# Patient Record
Sex: MALE | Race: WHITE | NOT HISPANIC OR LATINO | Employment: OTHER | URBAN - METROPOLITAN AREA
[De-identification: names, ages, dates, MRNs, and addresses within clinical notes are randomized per-mention and may not be internally consistent; named-entity substitution may affect disease eponyms.]

---

## 2021-02-11 ENCOUNTER — OFFICE VISIT (OUTPATIENT)
Dept: NEUROLOGY | Facility: CLINIC | Age: 61
End: 2021-02-11

## 2021-02-11 VITALS
SYSTOLIC BLOOD PRESSURE: 154 MMHG | HEIGHT: 72 IN | DIASTOLIC BLOOD PRESSURE: 94 MMHG | WEIGHT: 305 LBS | HEART RATE: 71 BPM | OXYGEN SATURATION: 98 % | BODY MASS INDEX: 41.31 KG/M2

## 2021-02-11 DIAGNOSIS — Z86.59 HISTORY OF MAJOR DEPRESSION: ICD-10-CM

## 2021-02-11 DIAGNOSIS — E11.42 DIABETIC PERIPHERAL NEUROPATHY (HCC): ICD-10-CM

## 2021-02-11 DIAGNOSIS — G25.0 BENIGN ESSENTIAL TREMOR: ICD-10-CM

## 2021-02-11 DIAGNOSIS — R41.3 MEMORY LOSS: Primary | ICD-10-CM

## 2021-02-11 PROCEDURE — 99204 OFFICE O/P NEW MOD 45 MIN: CPT | Performed by: PSYCHIATRY & NEUROLOGY

## 2021-02-11 RX ORDER — GLIPIZIDE 10 MG/1
10 TABLET ORAL
COMMUNITY

## 2021-02-11 RX ORDER — SERTRALINE HYDROCHLORIDE 100 MG/1
200 TABLET, FILM COATED ORAL DAILY
COMMUNITY

## 2021-02-11 RX ORDER — OMEPRAZOLE 20 MG/1
20 CAPSULE, DELAYED RELEASE ORAL DAILY
COMMUNITY

## 2021-02-11 RX ORDER — BUPROPION HYDROCHLORIDE 300 MG/1
300 TABLET ORAL DAILY
COMMUNITY

## 2021-02-11 RX ORDER — ACETAMINOPHEN 500 MG
500 TABLET ORAL EVERY 6 HOURS PRN
COMMUNITY

## 2021-02-11 RX ORDER — SILDENAFIL 50 MG/1
50 TABLET, FILM COATED ORAL DAILY PRN
COMMUNITY

## 2021-02-11 RX ORDER — WARFARIN SODIUM 7.5 MG/1
10 TABLET ORAL NIGHTLY
COMMUNITY
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
## 2021-02-18 RX ORDER — HYDROXYZINE HYDROCHLORIDE 25 MG/1
25 TABLET, FILM COATED ORAL AS NEEDED
COMMUNITY

## 2021-02-18 RX ORDER — LORATADINE 10 MG/1
10 TABLET ORAL DAILY
COMMUNITY

## 2021-02-18 RX ORDER — BUPROPION HYDROCHLORIDE 150 MG/1
150 TABLET, EXTENDED RELEASE ORAL 2 TIMES DAILY
COMMUNITY

## 2021-02-18 RX ORDER — METHOCARBAMOL 500 MG/1
500 TABLET, FILM COATED ORAL AS NEEDED
COMMUNITY

## 2021-02-18 RX ORDER — AMITRIPTYLINE HYDROCHLORIDE 25 MG/1
25 TABLET, FILM COATED ORAL NIGHTLY
COMMUNITY

## 2021-02-18 RX ORDER — PRAVASTATIN SODIUM 20 MG
20 TABLET ORAL DAILY
COMMUNITY

## 2021-02-18 RX ORDER — LISINOPRIL 20 MG/1
20 TABLET ORAL DAILY
COMMUNITY

## 2021-02-18 RX ORDER — LEVOTHYROXINE SODIUM 0.1 MG/1
100 TABLET ORAL DAILY
COMMUNITY

## 2021-02-18 RX ORDER — PRAZOSIN HYDROCHLORIDE 2 MG/1
2 CAPSULE ORAL NIGHTLY
COMMUNITY

## 2021-02-18 RX ORDER — ZOLPIDEM TARTRATE 5 MG/1
10 TABLET ORAL NIGHTLY PRN
COMMUNITY

## 2021-03-01 ENCOUNTER — TELEPHONE (OUTPATIENT)
Dept: NEUROLOGY | Facility: CLINIC | Age: 61
End: 2021-03-01

## 2021-03-01 NOTE — TELEPHONE ENCOUNTER
Provider:     Caller: PT    Relationship to Patient: SELF    Pharmacy: NA    Phone Number: [phone]  Reason for Call: PATIENT CALLED TO CHECK ON REFERRAL FOR MEMORY TEST. I SEE REFERRAL TO NEUROPSYCH BUT HE STATES HE HAS NOT HEARD ANYTHING YET ABOUT SETTING UP APPT. PLEASE ADVISE.     When was the patient last seen: 2-11-21

## 2021-03-02 ENCOUNTER — TELEPHONE (OUTPATIENT)
Dept: NEUROLOGY | Facility: CLINIC | Age: 61
End: 2021-03-02

## 2021-03-02 NOTE — TELEPHONE ENCOUNTER
SAWYER [facility]  [phone]    SAWYER CALLED IN BECAUSE HE IS WONDERING IF THE OFFICE COULD [facility]X [facility] A COPY OF THE PT'S LAST OV NOTE FROM HIS APPT WITH DR HENAO ON 02/11. THEIR FAX NUMBER [phone].

## 2021-04-13 ENCOUNTER — TELEPHONE (OUTPATIENT)
Dept: NEUROLOGY | Facility: CLINIC | Age: 61
End: 2021-04-13

## 2021-04-13 NOTE — TELEPHONE ENCOUNTER
ROMANM for pt to call office. Unfortunately his appointment with [facility] will need to be rescheduled for 60 minutes because he is new to [facility].

## 2021-04-13 NOTE — TELEPHONE ENCOUNTER
Pt was calling to see if Dr. Kline had a chance to look over [facility].

## 2021-04-14 NOTE — TELEPHONE ENCOUNTER
Please advise. Pt no showed his follow up apt with you to go over these results. He is scheudled with [facility] 05/28/2021. Please review. His results are both scanned under media. The MRI and Neuro Psych testing. Thank you.

## 2021-04-16 NOTE — TELEPHONE ENCOUNTER
Please advise. LVM for pt. This will need to be discussed and results given at f/u if pt calls back please notify him of this thank you.

## 2021-04-16 NOTE — TELEPHONE ENCOUNTER
Patient will need to be seen for review of these results.  The results of testing and explanation would be quite lengthy for a phone conversation.

## 2021-05-28 ENCOUNTER — OFFICE VISIT (OUTPATIENT)
Dept: NEUROLOGY | Facility: CLINIC | Age: 61
End: 2021-05-28

## 2021-05-28 VITALS
HEIGHT: 72 IN | DIASTOLIC BLOOD PRESSURE: 76 MMHG | BODY MASS INDEX: 41.04 KG/M2 | OXYGEN SATURATION: 95 % | WEIGHT: 303 LBS | SYSTOLIC BLOOD PRESSURE: 138 MMHG | HEART RATE: 72 BPM

## 2021-05-28 DIAGNOSIS — E11.42 DIABETIC PERIPHERAL NEUROPATHY (HCC): ICD-10-CM

## 2021-05-28 DIAGNOSIS — E66.01 CLASS 3 SEVERE OBESITY DUE TO EXCESS CALORIES WITH SERIOUS COMORBIDITY AND BODY MASS INDEX (BMI) OF 40.0 TO 44.9 IN ADULT (HCC): ICD-10-CM

## 2021-05-28 DIAGNOSIS — G25.0 BENIGN ESSENTIAL TREMOR: ICD-10-CM

## 2021-05-28 DIAGNOSIS — R41.3 MEMORY LOSS: ICD-10-CM

## 2021-05-28 DIAGNOSIS — F32.2 SEVERE DEPRESSION (HCC): ICD-10-CM

## 2021-05-28 PROCEDURE — 99215 OFFICE O/P EST HI 40 MIN: CPT | Performed by: NURSE PRACTITIONER
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
## 2021-05-28 NOTE — PATIENT INSTRUCTIONS
Recommended Lifestyle Modifications:   -Regular physical activity (ie Silver Sneakers programs)  -Regular social activity (ie clubs, Shinto groups, etc)  -Regular mental stimulation (ie puzzles, cross-words, crafts, etc)  -Healthy diet (ie Mediterranean or DASH diet)

## 2021-05-31 PROBLEM — E66.01 CLASS 3 SEVERE OBESITY DUE TO EXCESS CALORIES IN ADULT (HCC): Status: ACTIVE | Noted: 2021-05-31

## 2021-05-31 PROBLEM — F32.2 SEVERE DEPRESSION (HCC): Status: ACTIVE | Noted: 2021-05-31

## 2021-06-07 ENCOUNTER — TELEPHONE (OUTPATIENT)
Dept: NEUROLOGY | Facility: CLINIC | Age: 61
End: 2021-06-07

## 2021-06-07 NOTE — TELEPHONE ENCOUNTER
----- Message from AUBRIE Santoyo sent at 5/31/2021  6:56 PM EDT -----  Can we get patient's MRI brain, original [facility] that he [facility] in [facility] 2021. Thanksl

## 2021-10-20 ENCOUNTER — HOSPITAL ENCOUNTER (OUTPATIENT)
Dept: MRI IMAGING | Facility: HOSPITAL | Age: 61
Discharge: HOME | End: 2021-10-20
Admitting: NURSE PRACTITIONER

## 2021-10-20 ENCOUNTER — TELEPHONE (OUTPATIENT)
Dept: NEUROLOGY | Facility: OTHER | Age: 61
End: 2021-10-20

## 2021-10-20 DIAGNOSIS — R41.3 MEMORY LOSS: ICD-10-CM

## 2021-10-20 PROCEDURE — 82565 ASSAY OF CREATININE: CPT

## 2021-10-20 PROCEDURE — A9577 INJ MULTIHANCE: HCPCS | Performed by: NURSE PRACTITIONER

## 2021-10-20 PROCEDURE — 70553 MRI BRAIN STEM W/O & W/DYE: CPT

## 2021-10-20 PROCEDURE — 0 GADOBENATE DIMEGLUMINE 529 MG/ML SOLUTION: Performed by: NURSE PRACTITIONER

## 2021-10-20 RX ADMIN — GADOBENATE DIMEGLUMINE 20 ML: 529 INJECTION, SOLUTION INTRAVENOUS at 17:57

## 2021-10-20 NOTE — TELEPHONE ENCOUNTER
PT CALLING TO SCHEDULE MRI ORDERED BY TATO BRYANT.  WAS ABLE TO WARM TRANSFER TO CENTRAL SCHEDULING.

## 2021-10-21 LAB — CREAT BLDA-MCNC: 1.3 MG/DL (ref 0.6–1.3)

## 2021-10-28 ENCOUNTER — TELEPHONE (OUTPATIENT)
Dept: NEUROLOGY | Facility: CLINIC | Age: 61
End: 2021-10-28

## 2021-10-28 NOTE — TELEPHONE ENCOUNTER
----- Message from AUBRIE Santoyo sent at 10/28/2021  1:04 PM EDT -----  Can you please let patient know that his recent MRI brain is similar to his reported results [facility]. There is no evidence of stroke, bleed or tumor. We will review in more detail at his upcoming appointment. Thanks.  LQ

## 2021-11-24 ENCOUNTER — OFFICE VISIT (OUTPATIENT)
Dept: NEUROLOGY | Facility: CLINIC | Age: 61
End: 2021-11-24

## 2021-11-24 VITALS
OXYGEN SATURATION: 96 % | WEIGHT: 315 LBS | HEIGHT: 72 IN | RESPIRATION RATE: 16 BRPM | BODY MASS INDEX: 42.66 KG/M2 | DIASTOLIC BLOOD PRESSURE: 68 MMHG | HEART RATE: 77 BPM | SYSTOLIC BLOOD PRESSURE: 130 MMHG

## 2021-11-24 DIAGNOSIS — R41.3 MEMORY LOSS: ICD-10-CM

## 2021-11-24 DIAGNOSIS — E11.42 DIABETIC PERIPHERAL NEUROPATHY (HCC): ICD-10-CM

## 2021-11-24 DIAGNOSIS — R42 DIZZINESS: ICD-10-CM

## 2021-11-24 DIAGNOSIS — G25.0 BENIGN ESSENTIAL TREMOR: ICD-10-CM

## 2021-11-24 DIAGNOSIS — F32.2 SEVERE DEPRESSION (HCC): ICD-10-CM

## 2021-11-24 DIAGNOSIS — R55 SYNCOPE, UNSPECIFIED SYNCOPE TYPE: ICD-10-CM

## 2021-11-24 PROCEDURE — 99215 OFFICE O/P EST HI 40 MIN: CPT | Performed by: NURSE PRACTITIONER

## 2021-11-24 RX ORDER — DONEPEZIL HYDROCHLORIDE 5 MG/1
TABLET, FILM COATED ORAL
Qty: 60 TABLET | Refills: 3 | Status: SHIPPED | OUTPATIENT
Start: 2021-11-24

## 2021-11-24 NOTE — PROGRESS NOTES
"DOS: 2021  NAME: Dre Combs   : 1960  PCP: Janette العراقي MD    Chief Complaint   Patient presents with   • Memory Loss      SUBJECTIVE  Neurological Problem:  60 y.o. RHW male with memory problems, tremor, DM peripheral neuropathy, HTN, HLD, Mech AVR (on warfarin), hypothyroid, STEPHANIE (compliant with CPAP), ?TBI who presents today for f/u of memory problems and new complaints of falls, gait issues. He is accompanied by his spouse.    Interval History:   **For detailed previous history, please see progress note dated 2021.    Mr. Combs was initially evaluated by Dr. Kline for memory loss, neuropsych evaluation indicating severe depressive symptomatology which is likely major contributor causing a pseudodementia.  There is also mention of possible neurocognitive disorder due to Parkinson's; however, patient noted to have an essential-like tremor, no evidence of other parkinsonian features on exam when last seen by me in May 2021. I did order an repeat MRI brain.  Due to time constraints, we were not able to address complaint of dizziness, I advised him to follow-up with his cardiologist as he is not current with one despite having a mechanical valve, no reports of an echocardiogram in some time.      MRI brain was done in 2021, negative for any acute findings, mild small vessel ischemic changes noted, and on parietal regions with atrophy and volume loss resulting in mildly prominent lateral and third ventricles. Mention of unable to r/o NPH; patient does have memory problems and c/o dizziness, balance issues but no significant gait disturbance and no consistent urinary issues.     Patient tells me today he [facility], reportedly work-up was \"normal \", no etiology for his dizziness was reported, no records currently available.  He continues to be forgetful, mostly of names, confused over past events, timing, forgets conversations.  No significant " "worsening in memory since since his last visit.  His mood continues to be depressed, she has been a chronic issue, has been [facility], is actively being managed.  No change in personality or behavior, passivity.  He has seen therapists in the past also, but there is significant turn over and continuity of care was poor and therefore he no longer sees a therapist. Sleep is not very good, wakes at night, he does use CPAP, has frequent nightmares for which he is treated. No hallucinations.  No nighttime wanderings.  His appetite is good, eats infrequently but large portions, sometimes choking on food but no problems taking pills, no drooling. He continues to have stability issues, episodes of lightheadedness, feels like he has to sit down.  He also has \"spinning\" sensation when lying down in bed.  He denies any changes in his health since his last visit.  [facility], no lab work currently available.  He has had physical therapy in the past but none recent.  No falls but does say when he gets dizzy that he has a near fall, uses a cane when out of the home.  History of concussions as a kid, parachute injury.   No personal history of seizure, stroke, CNS infections.   Sister of Parkinson's. Mother  of lung CA, first cousins with MS.     Review of Systems:Review of Systems   Constitutional: Positive for fatigue. Negative for activity change and appetite change.   HENT: Positive for tinnitus. Negative for ear pain and trouble swallowing.    Eyes: Positive for visual disturbance (vision will go out of focus). Negative for photophobia and pain.   Musculoskeletal: Positive for back pain and gait problem (uses cane, multiple falls this year). Negative for neck pain.   Neurological: Positive for dizziness, speech difficulty (word searching), light-headedness and numbness (both feet). Negative for tremors, seizures, syncope, facial asymmetry, weakness and headaches. "   Psychiatric/Behavioral: Positive for agitation, confusion, decreased concentration and sleep disturbance (trouble staying asleep-5 hours). Negative for behavioral problems, dysphoric mood, hallucinations, self-injury and suicidal ideas. The patient is nervous/anxious. The patient is not hyperactive.     Above ROS reviewed    The following portions of the patient's history were reviewed and updated as appropriate: allergies, current medications, past family history, past medical history, past social history, past surgical history and problem list.    Current Medications:   Current Outpatient Medications:   •  acetaminophen (TYLENOL) 500 MG tablet, Take 500 mg by mouth Every 6 (Six) Hours As Needed for Mild Pain ., Disp: , Rfl:   •  amitriptyline (ELAVIL) 25 MG tablet, Take 25 mg by mouth Every Night., Disp: , Rfl:   •  buPROPion SR (WELLBUTRIN SR) 150 MG 12 hr tablet, Take 150 mg by mouth 2 (Two) Times a Day., Disp: , Rfl:   •  buPROPion XL (WELLBUTRIN XL) 300 MG 24 hr tablet, Take 300 mg by mouth Daily., Disp: , Rfl:   •  glipizide (GLUCOTROL) 10 MG tablet, Take 10 mg by mouth 2 (Two) Times a Day Before Meals., Disp: , Rfl:   •  hydrOXYzine (ATARAX) 25 MG tablet, Take 25 mg by mouth As Needed for Itching., Disp: , Rfl:   •  LACTOBACILLUS ACID-PECTIN PO, Take  by mouth., Disp: , Rfl:   •  levothyroxine (SYNTHROID, LEVOTHROID) 100 MCG tablet, Take 100 mcg by mouth Daily., Disp: , Rfl:   •  lisinopril (PRINIVIL,ZESTRIL) 20 MG tablet, Take 20 mg by mouth Daily., Disp: , Rfl:   •  loratadine (CLARITIN) 10 MG tablet, Take 10 mg by mouth Daily., Disp: , Rfl:   •  methocarbamol (ROBAXIN) 500 MG tablet, Take 500 mg by mouth As Needed for Muscle Spasms., Disp: , Rfl:   •  omeprazole (priLOSEC) 20 MG capsule, Take 20 mg by mouth Daily., Disp: , Rfl:   •  pravastatin (PRAVACHOL) 20 MG tablet, Take 20 mg by mouth Daily., Disp: , Rfl:   •  prazosin (MINIPRESS) 2 MG capsule, Take 2 mg by mouth Every Night., Disp: , Rfl:   •   sertraline (ZOLOFT) 100 MG tablet, Take 200 mg by mouth Daily., Disp: , Rfl:   •  sildenafil (VIAGRA) 50 MG tablet, Take 50 mg by mouth Daily As Needed for Erectile Dysfunction., Disp: , Rfl:   •  warfarin (COUMADIN) 7.5 MG tablet, Take 10 mg by mouth Every Night., Disp: , Rfl:   •  zolpidem (AMBIEN) 5 MG tablet, Take 10 mg by mouth At Night As Needed for Sleep., Disp: , Rfl:   **I did not stop or change the above medications.  Patient's medication list was updated to reflect medications they have reported as currently taking, including medication changes made by other providers.    OBJECTIVE  Vitals:    11/24/21 1108   BP: 130/68   Pulse: 77   Resp: 16   SpO2: 96%     Body mass index is 42.72 kg/m².    Diagnostics:  MRI brain 10/20/2021: FINDINGS: There is no evidence of restricted diffusion to suggest acute  infarction. Mild small vessel ischemic disease is noted. There is fluid  throughout the mastoid air cells on the left similar to slightly less  prominent as compared to the MRI examination of 03/02/2021.  The axial T2 FLAIR sequence demonstrates small foci of increased signal  intensity involving the periventrical white matter of the cerebral  hemispheres bilaterally.  There is prominence of sulci involving the frontal and parietal regions  which is likely a function of atrophy and volume loss. The lateral and  third ventricles are mildly prominent which is likely a function of  atrophy and volume loss.. There is no evidence of transependymal  migration of fluid or enlargement of the temporal horns.  Thin section imaging through the internal auditory canals demonstrate  normal-appearing cochlea and semicircular canals bilaterally. There was  no evidence of mass or of abnormal enhancement after contrast  administration.  IMPRESSION:  Mild small vessel ischemic disease and prominent sulci  consistent with atrophy involving the frontal and parietal lobes is  noted. The lateral and third ventricles mildly  prominent. This is likely  a function of atrophy/volume loss. The temporal horns are not enlarged.  Normal pressure hydrocephalus cannot be entirely excluded. Clinical  correlation is suggested. There was no evidence of mass or of abnormal  enhancement after contrast administration. Fluid is present within the  mastoid air cells on the left similar to slightly less prominent as  compared to the prior MRI examination.    Laboratory Results:         No results found for: WBC, HGB, HCT, MCV, PLT  Lab Results   Component Value Date    CREATININE 1.30 10/20/2021     Physical Exam:  GENERAL: NAD, abdominal obesity  HEENT: Normocephalic, atraumatic   COR: RRR  Resp: Even and unlabored  Extremities: No signs of distal embolization.   Skin: No rashes, lesions or ulcers.  Psychiatric: Normal mood and affect.    Neurological:   MS: AO. Language normal. No neglect. Follows all commands.  CN: II-XII grossly normal  Motor: Normal strength and tone throughout, mild BUE postural tremor.  No resting tremor  Sensory: Intact to light touch in arms and legs  Station and Gait: Gait, no shuffling, normal turns and arm swings.  Utilizing cane today  Coordination: Normal finger to nose bilaterally    ImpressionPlan:    1. Mild cognitive impairment  Reviewed recent MRI images with patient and spouse today -- frontal and parietal atrophy  Requested most recent labs from PCP  Start Aricept 5 mg nightly, increase to 10 mg if tolerates  Continue to follow-up with psychiatry, aggressive treatment of anxiety/depression/PTSD recommended  Aggressive control of vascular RFs  Reviewed lifestyle modifications for cognitive health including regular physical activity, social activity, mental stimulation and healthy diet    2. Dizziness and h/o syncopal episodes --may be multifactorial, possibly BPPV contributing  Requested most recent work[facility]  Check CTA head and neck for possible VBI  Recommended to keep well-hydrated,  transition slowly, sitter compression stockings  Consider referral to the [facility]    3. Balance issues, multifactorial, peripheral neuropathy with #1 and #2 likely contributing    Follow-up in 4 months, sooner if symptoms warrant.    I spent a total of 50 minutes today in reviewing records, prior diagnostics, examination of patient as well as counseling and educating patient regarding diagnoses, symptoms, reviewing MRI images with patient, pharmacologic treatment options including purpose, risk, benefits, possible side-effects, recommendations, lifestyle modifications, coordination of care and documenting plan of care.        There are no diagnoses linked to this encounter.    Coding      Dictated using Dragon   Chest, general

## 2021-12-06 ENCOUNTER — TELEPHONE (OUTPATIENT)
Dept: NEUROLOGY | Facility: CLINIC | Age: 61
End: 2021-12-06

## 2021-12-06 NOTE — TELEPHONE ENCOUNTER
----- Message from AUBRIE Santoyo sent at 11/24/2021  2:19 PM EST -----  Can we get this [facility], including [facility]CP and labs and cardiology.   Thanks.

## 2022-03-15 ENCOUNTER — TELEPHONE (OUTPATIENT)
Dept: NEUROLOGY | Facility: CLINIC | Age: 62
End: 2022-03-15

## 2022-03-15 NOTE — TELEPHONE ENCOUNTER
Called and left patient a detailed voice mail, regarding CTA Head & Neck. Patient is scheduled for 3/22/23 @ 2:30pm. Patient must be NPO 3 hours prior to scan.

## 2022-03-15 NOTE — TELEPHONE ENCOUNTER
PT WOULD LIKE TO HAVE A SEDATIVE FOR THE CT AS HE HAS PTSD AND CAN NOT HAVE HIS HEAD LOCKED DOWN FOR THAT LONG DUE TO THIS. PLEASE ADVISE PT.

## 2022-03-15 NOTE — TELEPHONE ENCOUNTER
Please review and advise.   Also his follow up with you is the day after his testing. Should I push that out a bit or do you think that would be ok?

## 2022-03-22 ENCOUNTER — HOSPITAL ENCOUNTER (OUTPATIENT)
Dept: CT IMAGING | Facility: HOSPITAL | Age: 62
Discharge: HOME | End: 2022-03-22
Admitting: NURSE PRACTITIONER

## 2022-03-22 DIAGNOSIS — R55 SYNCOPE, UNSPECIFIED SYNCOPE TYPE: ICD-10-CM

## 2022-03-22 DIAGNOSIS — R42 DIZZINESS: ICD-10-CM

## 2022-03-22 LAB
CREAT BLDA-MCNC: 1.2 MG/DL (ref 0.6–1.3)
EGFRCR SERPLBLD CKD-EPI 2021: 68 ML/MIN/1.73

## 2022-03-22 PROCEDURE — 0 IOPAMIDOL PER 1 ML: Performed by: NURSE PRACTITIONER

## 2022-03-22 PROCEDURE — 70498 CT ANGIOGRAPHY NECK: CPT

## 2022-03-22 PROCEDURE — 70496 CT ANGIOGRAPHY HEAD: CPT

## 2022-03-22 PROCEDURE — 82565 ASSAY OF CREATININE: CPT

## 2022-03-22 RX ADMIN — IOPAMIDOL 100 ML: 755 INJECTION, SOLUTION INTRAVENOUS at 14:29

## 2022-03-23 ENCOUNTER — TELEPHONE (OUTPATIENT)
Dept: NEUROLOGY | Facility: CLINIC | Age: 62
End: 2022-03-23

## 2022-03-23 NOTE — TELEPHONE ENCOUNTER
----- Message from AUBRIE Santoyo sent at 3/23/2022  7:12 AM EDT -----  Can you let patient know that his CTA head/neck showed no evidence of narrowing to explain his dizziness. We can discuss in more detail at his f/u appt that is coming up Thanks.

## 2022-04-05 ENCOUNTER — OFFICE VISIT (OUTPATIENT)
Dept: NEUROLOGY | Facility: CLINIC | Age: 62
End: 2022-04-05

## 2022-04-05 VITALS
HEIGHT: 72 IN | HEART RATE: 88 BPM | SYSTOLIC BLOOD PRESSURE: 138 MMHG | OXYGEN SATURATION: 97 % | BODY MASS INDEX: 40.9 KG/M2 | WEIGHT: 302 LBS | DIASTOLIC BLOOD PRESSURE: 68 MMHG

## 2022-04-05 DIAGNOSIS — R41.89 COGNITIVE IMPAIRMENT: Primary | ICD-10-CM

## 2022-04-05 DIAGNOSIS — E11.42 DIABETIC PERIPHERAL NEUROPATHY: ICD-10-CM

## 2022-04-05 DIAGNOSIS — F32.2 SEVERE DEPRESSION: ICD-10-CM

## 2022-04-05 DIAGNOSIS — R42 DIZZINESS: ICD-10-CM

## 2022-04-05 PROCEDURE — 99215 OFFICE O/P EST HI 40 MIN: CPT | Performed by: NURSE PRACTITIONER

## 2022-04-05 RX ORDER — MEMANTINE HYDROCHLORIDE 5 MG/1
TABLET ORAL
Qty: 120 TABLET | Refills: 0 | Status: SHIPPED | OUTPATIENT
Start: 2022-04-05

## 2022-04-05 NOTE — PROGRESS NOTES
DOS: 2022  NAME: Dre Combs   : 1960  PCP: Janette العراقي MD    Chief Complaint   Patient presents with   • Memory Loss      SUBJECTIVE  Neurological Problem:  61 y.o. RHW male with memory problems, tremor, DM, peripheral neuropathy, HTN, HLD, Mech AVR (on warfarin), hypothyroid, STEPHANIE (compliant with CPAP), ?TBI, PTSD  ([facility]) who presents today for f/u of memory problems, dizziness, balance and gait issues. He is accompanied by his spouse via telephone.     Interval History:   **For detailed previous history, please see progress note dated 2021    Mr. Combs was initially evaluated by Dr. Kline for memory loss, neuropsych evaluation indicating severe depressive symptomatology which is likely major contributor causing a pseudodementia.  There is also mention of possible neurocognitive disorder due to Parkinson's; however, patient noted to have an essential-like tremor, no evidence of other parkinsonian features on exam. I did order an repeat MRI brain that showed mild small vessel ischemic changes, some prominence of lateral and third ventricles however likely due to atrophy/volume loss. Given his reports of dizziness, balance issues, falls a subsequent CTA head and neck was ordered to assess posterior circulation for possible VBI contributing to his symptoms.  In regards to memory, he continue with short-term memory issues and was started on Aricept 5 mg, titrated to 10 mg nightly.     Review of CTA head and neck that was done on  shows no evidence of flow-limiting stenosis, no occlusions or aneurysms or dissections.  Review of PCP notes, labs show that he had complaints of bilateral hand pain, rheumatoid labs were unremarkable.  He is getting what sounds like occupational therapy for his hands.    Patient presents today along with his spouse via phone, he tolerated Aricept 5 mg and is now currently on 10 mg nightly.  He denies any medication side effects, no  real change in memory per spouse and patient, it apparently has been causing some relationship issues, patient frequently repeating questions which can be a major source of contention.  They both deny that there is any worsening with his memory.  He [facility], actively managing those medications.  Both deny any change in personality or behavior.  Sleep is okay, continues to use CPAP, does have a history of nightmares but no hallucinations.  No nighttime wandering's.  Appetite is good.  No choking, no problems taking pills.  No significant changes in his gait, has a cane today, does feel unsteady and has continued to have falls related to dizzy spells.  He apparently has [facility] who is working him up for sounds like a vestibular assessment.  He denies any other changes in his health since his last visit.     History of concussions as a kid, parachute injury.   No personal history of seizure, stroke, CNS infections.   Sister of Parkinson's. Mother  of lung CA, first cousins with MS.     Review of Systems:Review of Systems   HENT: Negative for trouble swallowing and voice change.    Musculoskeletal: Positive for back pain and gait problem.   Neurological: Positive for dizziness, tremors, weakness and light-headedness. Negative for seizures, syncope, facial asymmetry, speech difficulty, numbness and headaches.   Psychiatric/Behavioral: Positive for confusion. Negative for agitation, behavioral problems, decreased concentration, dysphoric mood, hallucinations, self-injury, sleep disturbance and suicidal ideas. The patient is nervous/anxious. The patient is not hyperactive.     Above ROS reviewed    The following portions of the patient's history were reviewed and updated as appropriate: allergies, current medications, past family history, past medical history, past social history, past surgical history and problem list.    Current Medications:   Current Outpatient  Medications:   •  acetaminophen (TYLENOL) 500 MG tablet, Take 500 mg by mouth Every 6 (Six) Hours As Needed for Mild Pain ., Disp: , Rfl:   •  amitriptyline (ELAVIL) 25 MG tablet, Take 25 mg by mouth Every Night., Disp: , Rfl:   •  buPROPion SR (WELLBUTRIN SR) 150 MG 12 hr tablet, Take 150 mg by mouth 2 (Two) Times a Day., Disp: , Rfl:   •  buPROPion XL (WELLBUTRIN XL) 300 MG 24 hr tablet, Take 300 mg by mouth Daily., Disp: , Rfl:   •  donepezil (Aricept) 5 MG tablet, Take one tablet by mouth x one week, then take 2 tables every night, Disp: 60 tablet, Rfl: 3  •  glipizide (GLUCOTROL) 10 MG tablet, Take 10 mg by mouth 2 (Two) Times a Day Before Meals., Disp: , Rfl:   •  hydrOXYzine (ATARAX) 25 MG tablet, Take 25 mg by mouth As Needed for Itching., Disp: , Rfl:   •  LACTOBACILLUS ACID-PECTIN PO, Take  by mouth., Disp: , Rfl:   •  levothyroxine (SYNTHROID, LEVOTHROID) 100 MCG tablet, Take 100 mcg by mouth Daily., Disp: , Rfl:   •  lisinopril (PRINIVIL,ZESTRIL) 20 MG tablet, Take 20 mg by mouth Daily., Disp: , Rfl:   •  loratadine (CLARITIN) 10 MG tablet, Take 10 mg by mouth Daily., Disp: , Rfl:   •  methocarbamol (ROBAXIN) 500 MG tablet, Take 500 mg by mouth As Needed for Muscle Spasms., Disp: , Rfl:   •  omeprazole (priLOSEC) 20 MG capsule, Take 20 mg by mouth Daily., Disp: , Rfl:   •  pravastatin (PRAVACHOL) 20 MG tablet, Take 20 mg by mouth Daily., Disp: , Rfl:   •  prazosin (MINIPRESS) 2 MG capsule, Take 2 mg by mouth Every Night., Disp: , Rfl:   •  sertraline (ZOLOFT) 100 MG tablet, Take 200 mg by mouth Daily., Disp: , Rfl:   •  sildenafil (VIAGRA) 50 MG tablet, Take 50 mg by mouth Daily As Needed for Erectile Dysfunction., Disp: , Rfl:   •  warfarin (COUMADIN) 7.5 MG tablet, Take 10 mg by mouth Every Night., Disp: , Rfl:   •  zolpidem (AMBIEN) 5 MG tablet, Take 10 mg by mouth At Night As Needed for Sleep., Disp: , Rfl:   •  memantine (NAMENDA) 5 MG tablet, 1 tab daily for 1 week, then 1 tab twice daily for 1  week, then 2 tabs in the morning and 1 tab at night for 1 week, then 2 tabs twice daily, Disp: 120 tablet, Rfl: 0  **I did not stop or change the above medications.  Patient's medication list was updated to reflect medications they have reported as currently taking, including medication changes made by other providers.    OBJECTIVE  Vitals:    04/05/22 1018   BP: 138/68   Pulse: 88   SpO2: 97%     Body mass index is 40.95 kg/m².    Diagnostics:  CTA H/N 3/22/22: FINDINGS:  VASCULAR FINDINGS:   The aortic arch is unremarkable. There is 3 vessel arch anatomy. Right  brachiocephalic and bilateral subclavian arteries appear widely patent.  There is medial/retropharyngeal deviation of the common carotid  arteries, carotid bifurcations and cervical internal carotid arteries,  which is nearly patchy at the midline. There is minimal calcified plaque  at the carotid bifurcations without stenosis. The external carotid  arteries and distal branches appear within normal limits. The cervical  internal carotid arteries are widely patent. The origins of the  ophthalmic arteries appear within normal limits. There is a large  right-sided posterior communicating artery without clear definition of  the posterior communicating artery on the left. There is absent right A1  segment with a widely patent generous anterior communicating artery,  which gives rise to the right NATHALY. The M1 segments, left A1 segment and  distal NATHALY and MCA branches appear widely patent.  There is left vertebral artery dominance. The right vertebral artery is  diminutive. The left vertebral artery follows a normal course and  appears normal caliber throughout the neck and into the head. Bilateral  V4 segments are patent. Basilar artery is normal caliber. The right P1  segment is absent or hypoplastic. The left P1 segment is normal. Both  PCA branches appear widely patent.  NONVASCULAR FINDINGS: Lung apices appear clear. Superior mediastinal  structures appear  unremarkable. There is evidence of prior median  sternotomy. The thyroid and salivary glands appear within normal limits.  There is no focal fluid collection, mass or adenopathy in the neck.  Limited view of the intracranial contents is unremarkable. Orbits appear  unremarkable. The paranasal sinuses and the mastoid air cells appear  well aerated. There are no acute osseous abnormalities or destructive  bone lesions. The patient is a dentulous. There are mild multilevel  cervical disc, uncovertebral and facet degenerative changes.  IMPRESSION:  1. No significant vascular abnormality in the head or the neck. No  evidence of high-grade stenosis, occlusion, aneurysm or dissection.  2. No acute abnormalities in the neck.    Laboratory Results:         No results found for: WBC, HGB, HCT, MCV, PLT  Lab Results   Component Value Date    CREATININE 1.20 03/22/2022       Physical Exam:  GENERAL: NAD, obesity  HEENT: Normocephalic, atraumatic   COR: RRR  Resp: Even and unlabored  Extremities: No signs of distal embolization.   Skin: No rashes, lesions or ulcers.  Psychiatric: Normal mood and affect.    Neurological:   MS: AO. Language normal. No neglect. Follows all commands.  CN: II-XII grossly normal  Motor: Normal strength and tone throughout, mild bilateral upper extremity postural tremor, NO resting tremor, no rigidity.  Sensory: Intact to light touch in arms and legs  Station and Gait: Fairly normal gait, no shuffling, turns are normal, utilizing a cane today.    Coordination: Normal finger to nose bilaterally    Impression/Plan:     1. Cognitive impairment --symptoms stable on Aricept 10 mg nightly, but does continue with short-term memory issues.  Will start memantine 5 mg daily, titrate to 10 mg twice daily by increasing by 5 mg every week.  Indication, instructions on titration, risks, benefits, possible side effects reviewed today with patient and spouse.  Recommend he continue to follow-up with psychiatry for  aggressive treatment of anxiety/depression/PTSD to treat any component of pseudodementia.  Also recommend aggressive control vascular risk factors.  We reviewed the importance of lifestyle modifications for cognitive health strongly recommending regular physical activity, mental stimulation, healthy diet.    2.  Dizziness, history of syncopal episodes --likely multifactorial.  I reviewed the results of the CTA head and neck with patient and spouse today, no evidence of VBI.  [facility], with plans for what sounds like vestibular assessment we will therefore forego referral to [facility] at this point.  Recommend patient keep well-hydrated, transition slowly, compression stockings may help as well.    3.  Balance issues related to peripheral neuropathy and #2 above, sounds and he is getting occupational therapy currently, also recommend physical therapy for gait and balance training, patient to follow-up with his PCP.    He will follow-up here in 4 months, sooner if symptoms warrant    I spent a total of 40 minutes today in reviewing records, prior diagnostics, examination of patient as well as counseling and educating patient regarding diagnoses, symptoms, reviewing diagnostics with patient, pharmacologic treatment options including purpose, risk, benefits, possible side-effects, recommendations, lifestyle modifications, coordination of care and documenting plan of care.        Diagnoses and all orders for this visit:    1. Cognitive impairment (Primary)    2. Dizziness    3. Diabetic peripheral neuropathy (HCC)    4. Severe depression (HCC)    Other orders  -     memantine (NAMENDA) 5 MG tablet; 1 tab daily for 1 week, then 1 tab twice daily for 1 week, then 2 tabs in the morning and 1 tab at night for 1 week, then 2 tabs twice daily  Dispense: 120 tablet; Refill: 0        Coding      Dictated using Dragon

## 2025-08-05 ENCOUNTER — OFFICE (OUTPATIENT)
Dept: URBAN - METROPOLITAN AREA CLINIC 64 | Facility: CLINIC | Age: 65
End: 2025-08-05
Payer: OTHER GOVERNMENT

## 2025-08-05 VITALS
WEIGHT: 287 LBS | SYSTOLIC BLOOD PRESSURE: 130 MMHG | HEIGHT: 72 IN | HEART RATE: 77 BPM | DIASTOLIC BLOOD PRESSURE: 72 MMHG

## 2025-08-05 DIAGNOSIS — Z86.0101 PERSONAL HISTORY OF ADENOMATOUS AND SERRATED COLON POLYPS: ICD-10-CM

## 2025-08-05 DIAGNOSIS — G47.33 OBSTRUCTIVE SLEEP APNEA (ADULT) (PEDIATRIC): ICD-10-CM

## 2025-08-05 DIAGNOSIS — K74.69 OTHER CIRRHOSIS OF LIVER: ICD-10-CM

## 2025-08-05 PROCEDURE — 99204 OFFICE O/P NEW MOD 45 MIN: CPT | Performed by: INTERNAL MEDICINE
